# Patient Record
Sex: FEMALE | Race: BLACK OR AFRICAN AMERICAN | Employment: FULL TIME | URBAN - METROPOLITAN AREA
[De-identification: names, ages, dates, MRNs, and addresses within clinical notes are randomized per-mention and may not be internally consistent; named-entity substitution may affect disease eponyms.]

---

## 2021-03-19 LAB
ANTIBODY: NORMAL
HIV AG/AB: NORMAL

## 2022-01-20 LAB — PAP SMEAR: NORMAL

## 2023-01-18 ENCOUNTER — HOSPITAL ENCOUNTER (EMERGENCY)
Facility: CLINIC | Age: 43
Discharge: HOME | End: 2023-01-18
Attending: SPECIALIST
Payer: COMMERCIAL

## 2023-01-18 VITALS
RESPIRATION RATE: 16 BRPM | TEMPERATURE: 99.4 F | SYSTOLIC BLOOD PRESSURE: 142 MMHG | BODY MASS INDEX: 28.63 KG/M2 | WEIGHT: 200 LBS | HEIGHT: 70 IN | HEART RATE: 91 BPM | DIASTOLIC BLOOD PRESSURE: 90 MMHG | OXYGEN SATURATION: 97 %

## 2023-01-18 DIAGNOSIS — U07.1 POSITIVE SELF-ADMINISTERED ANTIGEN TEST FOR COVID-19: Primary | ICD-10-CM

## 2023-01-18 PROCEDURE — 99282 EMERGENCY DEPT VISIT SF MDM: CPT

## 2023-01-18 ASSESSMENT — ENCOUNTER SYMPTOMS
SORE THROAT: 1
BACK PAIN: 0
COUGH: 1
NAUSEA: 0
DIARRHEA: 0
ABDOMINAL PAIN: 0
VOMITING: 0
TROUBLE SWALLOWING: 0
SHORTNESS OF BREATH: 0

## 2023-01-18 ASSESSMENT — PAIN - FUNCTIONAL ASSESSMENT: PAIN_FUNCTIONAL_ASSESSMENT: NONE - DENIES PAIN

## 2023-01-18 NOTE — Clinical Note
Pansy Shoulder was seen and treated in our emergency department on 1/18/2023. She may return to work on 01/20/2023. Patient tested positive for COVID-19, she may return to work on 1/20/2023 if no fever for 24 hours. If you have any questions or concerns, please don't hesitate to call.       Nayan Meza, CHANG - CNP

## 2023-01-19 NOTE — DISCHARGE INSTRUCTIONS
PLEASE RETURN TO THE EMERGENCY DEPARTMENT IMMEDIATELY if your symptoms worsen in anyway. You should immediately return to the ER for symptoms such as new or worsening pain, difficulty breathing or swallowing, a change in your voice, a feeling of passing out, light headed, dizziness, chest pain, headache, neck pain, rash, abdominal pain or vomiting. Your current symptoms may not appear severe to yourself, however you are highly contagious and should self quarantine for at least 10 days from the onset of symptoms or 72 hours after resolution of your fever without any antipyretic medications (whichever is shorter). Isolation strategies should be discussed and reinforced to protect your friends and families. Take your medication as indicated and prescribed. If you are given an antibiotic then, make sure you get the prescription filled and take the antibiotics until finished. Please understand that at this time there is no evidence for a more serious underlying process, but that early in the process of an illness or injury, an emergency department workup can be falsely reassuring. You should contact your family doctor within the next 48 hours for a follow up appointment. Alissa Vázquez!!!    From [facility] Emergency Department ([facility]) and [address] [place] [address] Emergency Services    On behalf of the Emergency Department staff at [facility]), I would like to thank you for giving us the opportunity to address your health care needs and concerns. We hope that during your visit, our service was delivered in a professional and caring manner. Please keep [facility] Emergency Department ([facility]) in mind as we walk with you down the path to your own personal wellness. Please expect an automated text message or email from us so we can ask a few questions about your health and progress. Based on your answers, a clinician may call you back to offer help and instructions.     Please understand that early in the process of an illness or injury, an emergency department workup can be falsely reassuring. If you notice any worsening, changing or persistent symptoms please call your family doctor or return to the ER immediately. Tell us how we did during your visit at [email or web address]   and let us know about your experience

## 2023-01-19 NOTE — ED NOTES
Patient to ED via self to room 9  Here for complaint of being positive for covid-19  Patient states she did an at home test today which came back positive  Patient has fatigue, body aches, and cold-like symptoms  Denies CP, SOB, or N/V    Vitals obtained and call light provided  Patient resting comfortably on stretcher in no apparent distress  Respirations even and non-labored  Provider at bedside for evaluation     Bj Gamble RN  01/18/23 2030

## 2023-01-19 NOTE — ED PROVIDER NOTES
[facility]  [address] [facility]  Phone: [phone]        Pt Name: Shasha Case  MRN: [id number]  [place] 1980  Date of evaluation: 1/18/23    CHIEFCOMPLAINT       Chief Complaint   Patient presents with    Positive For Covid-19       HISTORY OF PRESENT ILLNESS (Location/Symptom, Timing/Onset, Context/Setting, Quality, Duration, Modifying Factors, Severity)      Shasha Case is a 40 y.o. female with no pertinent PMH who presents to the ED via private auto with positive COVID test at home. Patient states she has sore throat, ear pain, runny nose, body aches, fevers and chills. She states that her symptoms started 5 days ago with over the last 2 days she has lost her sense of smell and taste. She denies any chest pain, shortness of breath, difficulty breathing, abdominal pain. She states that she had COVID and flu vaccinated. She states that multiple family numbers are sick as well. On arrival patient is resting on the cot comfortably with even unlabored breaths and is nontoxic-appearing with no acute distress noted. PAST MEDICAL / SURGICAL / SOCIAL / FAMILY HISTORY     PMH:  has a past medical history of Ganglion cyst and Scarring, keloid. Surgical History:  has a past surgical history that includes cyst removal (Right); Lithotripsy; cyst removal (Bilateral); [place] tooth extraction; and cyst removal (Right, 8/4/15). Social History:  reports that she has never smoked. She has never used smokeless tobacco. She reports that she does not drink alcohol and does not use drugs. Family History: She indicated that her mother is alive. She indicated that her father is alive. She indicated that her sister is alive. family history includes Diabetes in her mother; High Blood Pressure in her mother; Mental Illness in her sister. Psychiatric History: None    Allergies: Patient has no known allergies.     Home Medications:   Prior to Admission medications    Medication Sig Start Date End Date Taking? Authorizing Provider   oxyCODONE-acetaminophen (PERCOCET) 5-325 MG per tablet Take 1 tablet by mouth every 6 hours as needed for Pain 8/4/15   Jone Zendejas MD   acetaminophen-codeine (TYLENOL #3) 300-30 MG per tablet Take 1-2 tablets by mouth every 6 hours as needed for Pain 8/4/15   Jone Zendejas MD   NONFORMULARY BCP    Historical Provider, MD       REVIEW OF SYSTEMS  (2-9 systems for level 4, 10 ormore for level 5)      Review of Systems   Constitutional:  Positive for chills and fever. HENT:  Positive for congestion, ear pain and sore throat. Negative for trouble swallowing. Respiratory:  Positive for cough. Negative for shortness of breath. Cardiovascular:  Negative for chest pain and palpitations. Gastrointestinal:  Negative for abdominal pain, diarrhea, nausea and vomiting. Genitourinary:  Negative for dysuria and hematuria. Musculoskeletal:  Positive for myalgias. Negative for back pain, neck pain and neck stiffness. Neurological:  Positive for headaches. Negative for dizziness, weakness and numbness. All other systems negative except as marked. PHYSICAL EXAM  (up to 7 for level 4, 8 or more for level 5)      INITIAL VITALS:  height is 5' 10\" (1.778 m) and weight is 90.7 kg (200 lb). Her oral temperature is 99.4 °F (37.4 °C). Her blood pressure is 142/90 (abnormal) and her pulse is 91. Her respiration is 16 and oxygen saturation is 97%. Vital signs reviewed. Physical Exam  Constitutional:       General: She is not in acute distress. Appearance: Normal appearance. She is not ill-appearing or toxic-appearing. HENT:      Head: Normocephalic and atraumatic. Right Ear: Tympanic membrane, ear canal and external ear normal.      Left Ear: Tympanic membrane, ear canal and external ear normal.      Nose: Congestion and rhinorrhea present. Mouth/Throat:      Mouth: Mucous membranes are moist.      Pharynx: Oropharynx is clear. Cardiovascular:      Rate and Rhythm: Normal rate and regular rhythm. Pulses: Normal pulses. Heart sounds: Normal heart sounds. Pulmonary:      Effort: Pulmonary effort is normal.      Breath sounds: Normal breath sounds. Abdominal:      General: There is no distension. Palpations: Abdomen is soft. There is no mass. Tenderness: There is no abdominal tenderness. There is no guarding or rebound. Hernia: No hernia is present. Musculoskeletal:      Cervical back: Neck supple. No rigidity. Right lower leg: No edema. Left lower leg: No edema. Skin:     General: Skin is warm and dry. Capillary Refill: Capillary refill takes less than 2 seconds. Neurological:      General: No focal deficit present. Mental Status: She is alert. Psychiatric:         Mood and Affect: Mood normal.         Behavior: Behavior normal.         DIFFERENTIAL DIAGNOSIS / MDM     After my physical exam, the patient does have symptoms that  With COVID infection. She is resting on the cot comfortably with even unlabored breaths is nontoxic-appearing with no acute distress noted. Patient is requesting a COVID test for work, I did discuss with her that COVID tests are usually used for patients being admitted. I did offer a work note for the patient to return to work and with her permission I did put on the note that she was positive for COVID. Instructed patient to follow-up with her PCP within 1 day. Urged patient to treat symptomatically at home with over-the-counter cough and cold medications. Directed return with any chest pain, shortness of breath, difficulty breathing, abdominal pain, nausea vomiting diarrhea. Patient was agreement to this plan this time. All question concerns were answered at this time. At this time the patient is without objective evidence of an acute process requiring hospitalization or inpatient management.  They have remained hemodynamically stable throughout their entire ED visit and are stable for discharge with outpatient follow-up. The patient understands that at this time there is no evidence for a more malignant underlying process, but the patient also understands that early in the process of an illness or injury, an emergency department workup can be falsely reassuring. Routine discharge counseling was given, and the patient understands that worsening, changing or persistent symptoms should prompt an immediate call or follow up with their primary physician or return to the emergency department. The importance of appropriate follow up was also discussed. I have reviewed the disposition diagnosis with the patient and or their family/guardian. I have answered their questions and given discharge instructions. They voiced understanding of these instructions and did not have any further questions or complaints. PLAN (LABS / IMAGING / EKG):  No orders of the defined types were placed in this encounter. MEDICATIONS ORDERED:  No orders of the defined types were placed in this encounter. Controlled Substances Monitoring:     DIAGNOSTIC RESULTS     EKG: All EKG's are interpreted by the Emergency Department Physician who either signs or Co-signs this chart in the absenceof a cardiologist.        RADIOLOGY: All images are read by the radiologist and their interpretations are reviewed. No orders to display       No results found. LABS:  No results found for this visit on 01/18/23. EMERGENCY DEPARTMENT COURSE           Vitals:    Vitals:    01/18/23 2026   BP: (!) 142/90   Pulse: 91   Resp: 16   Temp: 99.4 °F (37.4 °C)   TempSrc: Oral   SpO2: 97%   Weight: 90.7 kg (200 lb)   Height: 5' 10\" (1.778 m)     -------------------------  BP: (!) 142/90, Temp: 99.4 °F (37.4 °C), Heart Rate: 91, Resp: 16      RE-EVALUATION:  See ED Course notes above. CONSULTS:  None    PROCEDURES:  None    FINAL IMPRESSION      1.  Positive self-administered antigen test for COVID-19          DISPOSITION / PLAN     CONDITION ON DISPOSITION:   Stable for discharge.      PATIENT REFERRED TO:  Flor Cantor MD  [address] [facility]  [phone]    Call in 1 day      [facility]  [address] [place] [address]  [phone]    If symptoms worsen      DISCHARGE MEDICATIONS:  New Prescriptions    No medications on file       CHANG Lai - [facility]   Emergency Medicine Nurse Practitioner    (Please note that portions of this note were completed with a voice recognition program.  Efforts were made to edit the dictations but occasionally words aremis-transcribed.)       CHANG Lai - CNP  01/18/23 2041

## 2023-01-19 NOTE — ED PROVIDER NOTES
[address] [facility] ED  Emergency Department  Faculty Attestation     Pt Name: Dilip Mclean  MRN: [id number]  [place] 1980  Date of evaluation: 1/18/23    I was personally available for consultation in the Emergency Department. Have reviewed everything on the chart that is available and agree with the documentation provided by the [facility] AND [facility], including discussion about the assessment, treatment plan and disposition. Dilip Mclean is a 40 y.o. female who presents with Positive For Covid-19      Vitals:   Vitals:    01/18/23 2026   BP: (!) 142/90   Pulse: 91   Resp: 16   Temp: 99.4 °F (37.4 °C)   TempSrc: Oral   SpO2: 97%   Weight: 90.7 kg (200 lb)   Height: 5' 10\" (1.778 m)       Impression:   1.  Positive self-administered antigen test for COVID-19          Agnieszka Gupta MD  01/18/23 2049

## 2023-06-15 ENCOUNTER — TELEPHONE (OUTPATIENT)
Age: 43
End: 2023-06-15

## 2023-06-28 RX ORDER — ESCITALOPRAM OXALATE 10 MG/1
10 TABLET ORAL DAILY
Qty: 90 TABLET | Refills: 3 | Status: SHIPPED | OUTPATIENT
Start: 2023-06-28

## 2023-07-03 ENCOUNTER — TELEPHONE (OUTPATIENT)
Age: 43
End: 2023-07-03

## 2023-07-03 NOTE — TELEPHONE ENCOUNTER
Patient seen at [facility] urgent care today. Please see encounter tab for note.

## 2023-07-03 NOTE — TELEPHONE ENCOUNTER
Looks like she was seen for potential UTI. I am unable to see record, but I can see U/A results and it looked suspicious for UTI.

## 2023-07-05 NOTE — TELEPHONE ENCOUNTER
Patient called and stated she was given bactrim at the urgent care for her UTI symptoms and tonight will be her last dosage that she has to take and she stated that the symptoms has gotten a little better but she is still having some urgency and spasms. So she wanted to know if she can get another round of antibiotics. Patient stated that you can leave a message on her voicemail.

## 2023-07-06 NOTE — TELEPHONE ENCOUNTER
Urine culture resulted and shows no UTI. The urgency feeling and spasms are not related to UTI. If she is still having these symptoms, she needs to be evaluated to see what is actually the cause so we can make it better. Thank you!

## 2023-07-17 DIAGNOSIS — Z11.4 ENCOUNTER FOR SCREENING FOR HIV: Primary | ICD-10-CM

## 2023-07-17 DIAGNOSIS — Z12.4 CERVICAL CANCER SCREENING: ICD-10-CM

## 2023-07-17 DIAGNOSIS — Z11.59 ENCOUNTER FOR HEPATITIS C SCREENING TEST FOR LOW RISK PATIENT: ICD-10-CM

## 2023-07-17 PROBLEM — F33.1 MODERATE EPISODE OF RECURRENT MAJOR DEPRESSIVE DISORDER (HCC): Status: ACTIVE | Noted: 2018-05-03

## 2023-07-17 PROBLEM — F41.1 GENERALIZED ANXIETY DISORDER: Status: ACTIVE | Noted: 2023-03-07

## 2023-07-17 PROBLEM — J30.89 NON-SEASONAL ALLERGIC RHINITIS: Status: ACTIVE | Noted: 2023-07-17

## 2023-07-17 PROBLEM — D21.9 FIBROIDS: Status: ACTIVE | Noted: 2023-07-17

## 2023-07-17 PROBLEM — K76.89 BENIGN LIVER CYST: Status: ACTIVE | Noted: 2023-07-17

## 2023-07-17 PROBLEM — D18.03 HEPATIC HEMANGIOMA: Status: ACTIVE | Noted: 2023-07-17

## 2023-07-17 PROBLEM — N28.9 RENAL LESION: Status: ACTIVE | Noted: 2023-07-17

## 2023-07-17 PROBLEM — Z78.9 NON-SMOKER: Status: ACTIVE | Noted: 2023-07-17

## 2023-07-17 NOTE — PROGRESS NOTES
Mapping quality metric labs/imagining done outside [facility].

## 2023-07-17 NOTE — PROGRESS NOTES
o  [address] Family Medicine Residency  [address] [facility], [facility] [facility]  Phone: [phone]  Fax: [phone]     Manuela Gregg is a 40 y.o. female for an annual wellness exam.    HPI     Subjective:  She has been feeling fine and has had no new problems since last office visit. REVIEWED INFORMATION    I have personally reviewed the medications, PMH, PSH, FH, allergies and social history. Past Medical History:   Diagnosis Date    Allergies     Cervical dysplasia     Ganglion cyst     Hemorrhoids     Scarring, keloid      Past Surgical History:   Procedure Laterality Date    DILATION AND CURETTAGE OF UTERUS  11/03/2020    hysteroscopy, D&C, novasure endometrial ablation, BTL (Dr Brandon Javier @ [facility])    ENDOMETRIAL ABLATION  11/03/2020    hysteroscopy, D&C, novasure endometrial ablation, BTL (Dr Brandon Javier @ [facility])    GANGLION CYST EXCISION Right 08/04/2015    HYSTEROSCOPY  11/03/2020    hysteroscopy, D&C, novasure endometrial ablation, BTL (Dr Brandon Javier @ [facility])    KELOID EXCISION Bilateral     keloids removed from bilateral ears    KNEE ARTHROSCOPY W/ PLICA EXCISION Left 69/04/9038    left knee arthroscopy with fat pad debridement and plica excision (Dr Mikel Valadez @ [facility])    KNEE ARTHROSCOPY W/ PLICA EXCISION Right 27/32/2980    right knee arthroscopy with fat pad debridement and plica excision (Dr Mikel Valadez @ [facility])    LEEP  04/18/2017    Dr Brandon Javier @ [facility]    LITHOTRIPSY Bilateral 10/28/2022    Lithotripsy for bilateral kidney stones (Dr Yonathan Mckinnon @ [facility])    LITHOTRIPSY  2010    MYOMECTOMY  2017    Dr Lai Manual  11/03/2020    hysteroscopy, D&C, novasure endometrial ablation, BTL (Dr Brandon Javier @ [facility])    WISDOM TOOTH EXTRACTION       Patient has no known allergies.   Social History     Tobacco Use    Smoking status: Never    Smokeless tobacco: Never   Substance Use Topics    Alcohol use: No     Family

## 2023-07-18 ENCOUNTER — OFFICE VISIT (OUTPATIENT)
Age: 43
End: 2023-07-18
Payer: COMMERCIAL

## 2023-07-18 VITALS
TEMPERATURE: 98.7 F | SYSTOLIC BLOOD PRESSURE: 112 MMHG | BODY MASS INDEX: 30.09 KG/M2 | WEIGHT: 210.2 LBS | HEIGHT: 70 IN | HEART RATE: 80 BPM | DIASTOLIC BLOOD PRESSURE: 74 MMHG | RESPIRATION RATE: 18 BRPM

## 2023-07-18 DIAGNOSIS — R73.9 ELEVATED BLOOD SUGAR: ICD-10-CM

## 2023-07-18 DIAGNOSIS — F33.1 MODERATE EPISODE OF RECURRENT MAJOR DEPRESSIVE DISORDER (HCC): ICD-10-CM

## 2023-07-18 DIAGNOSIS — Z78.9 NON-SMOKER: ICD-10-CM

## 2023-07-18 DIAGNOSIS — Z12.83 SKIN CANCER SCREENING: ICD-10-CM

## 2023-07-18 DIAGNOSIS — Z00.00 HEALTH MAINTENANCE EXAMINATION: Primary | ICD-10-CM

## 2023-07-18 DIAGNOSIS — F41.1 GENERALIZED ANXIETY DISORDER: ICD-10-CM

## 2023-07-18 PROCEDURE — 99396 PREV VISIT EST AGE 40-64: CPT | Performed by: FAMILY MEDICINE

## 2023-07-18 SDOH — ECONOMIC STABILITY: FOOD INSECURITY: WITHIN THE PAST 12 MONTHS, YOU WORRIED THAT YOUR FOOD WOULD RUN OUT BEFORE YOU GOT MONEY TO BUY MORE.: NEVER TRUE

## 2023-07-18 SDOH — ECONOMIC STABILITY: INCOME INSECURITY: HOW HARD IS IT FOR YOU TO PAY FOR THE VERY BASICS LIKE FOOD, HOUSING, MEDICAL CARE, AND HEATING?: NOT HARD AT ALL

## 2023-07-18 SDOH — ECONOMIC STABILITY: FOOD INSECURITY: WITHIN THE PAST 12 MONTHS, THE FOOD YOU BOUGHT JUST DIDN'T LAST AND YOU DIDN'T HAVE MONEY TO GET MORE.: NEVER TRUE

## 2023-07-18 SDOH — ECONOMIC STABILITY: HOUSING INSECURITY
IN THE LAST 12 MONTHS, WAS THERE A TIME WHEN YOU DID NOT HAVE A STEADY PLACE TO SLEEP OR SLEPT IN A SHELTER (INCLUDING NOW)?: NO

## 2023-07-18 ASSESSMENT — PATIENT HEALTH QUESTIONNAIRE - PHQ9
6. FEELING BAD ABOUT YOURSELF - OR THAT YOU ARE A FAILURE OR HAVE LET YOURSELF OR YOUR FAMILY DOWN: 0
4. FEELING TIRED OR HAVING LITTLE ENERGY: 0
SUM OF ALL RESPONSES TO PHQ QUESTIONS 1-9: 1
5. POOR APPETITE OR OVEREATING: 0
3. TROUBLE FALLING OR STAYING ASLEEP: 0
9. THOUGHTS THAT YOU WOULD BE BETTER OFF DEAD, OR OF HURTING YOURSELF: 0
SUM OF ALL RESPONSES TO PHQ QUESTIONS 1-9: 1
10. IF YOU CHECKED OFF ANY PROBLEMS, HOW DIFFICULT HAVE THESE PROBLEMS MADE IT FOR YOU TO DO YOUR WORK, TAKE CARE OF THINGS AT HOME, OR GET ALONG WITH OTHER PEOPLE: 1
7. TROUBLE CONCENTRATING ON THINGS, SUCH AS READING THE NEWSPAPER OR WATCHING TELEVISION: 1
8. MOVING OR SPEAKING SO SLOWLY THAT OTHER PEOPLE COULD HAVE NOTICED. OR THE OPPOSITE, BEING SO FIGETY OR RESTLESS THAT YOU HAVE BEEN MOVING AROUND A LOT MORE THAN USUAL: 0
SUM OF ALL RESPONSES TO PHQ QUESTIONS 1-9: 1
2. FEELING DOWN, DEPRESSED OR HOPELESS: 0
SUM OF ALL RESPONSES TO PHQ9 QUESTIONS 1 & 2: 0
SUM OF ALL RESPONSES TO PHQ QUESTIONS 1-9: 1
1. LITTLE INTEREST OR PLEASURE IN DOING THINGS: 0

## 2023-07-18 NOTE — PATIENT INSTRUCTIONS
You are up to date on all of your health screenings other than the diabetes and cholesterol screening (via bloodwork). You can get these done at the nearest [facility] lab that is most convenient for you. If the referral for your dermatologist doesn't work (due to insurance), please let me know. You have a generic referral for a new counselor. I suggest seeing if Lolita Escalona (from [facility] in [place]) is taking new patients - she is completely virtual for her appointments. If not, if you would like another suggestion, please let me know.

## 2023-07-18 NOTE — PROGRESS NOTES
[address] [facility]  [address] [facility], [address]  Phone: [phone]  Fax: [phone]     Juana Tatum is a 40 y.o. female for an annual wellness exam.    HPI     Subjective:  She has been feeling fine and has had no new problems since last office visit. REVIEWED INFORMATION    I have personally reviewed the medications, PMH, PSH, FH, allergies and social history. Past Medical History:   Diagnosis Date    Allergies     Cervical dysplasia     Ganglion cyst     Hemorrhoids     Scarring, keloid      Past Surgical History:   Procedure Laterality Date    DILATION AND CURETTAGE OF UTERUS  11/03/2020    hysteroscopy, D&C, novasure endometrial ablation, BTL (Dr Oscar Ruffin @ [facility])    ENDOMETRIAL ABLATION  11/03/2020    hysteroscopy, D&C, novasure endometrial ablation, BTL (Dr Oscar Ruffin @ [facility])    GANGLION CYST EXCISION Right 08/04/2015    HYSTEROSCOPY  11/03/2020    hysteroscopy, D&C, novasure endometrial ablation, BTL (Dr Oscar Ruffin @ [facility])    KELOID EXCISION Bilateral     keloids removed from bilateral ears    KNEE ARTHROSCOPY W/ PLICA EXCISION Left 77/77/9526    left knee arthroscopy with fat pad debridement and plica excision (Dr Iris Carver @ [facility])    KNEE ARTHROSCOPY W/ PLICA EXCISION Right 76/22/6464    right knee arthroscopy with fat pad debridement and plica excision (Dr Iris Carver @ [facility])    LEEP  04/18/2017    Dr Oscar Ruffin @ [facility]    LITHOTRIPSY Bilateral 10/28/2022    Lithotripsy for bilateral kidney stones (Dr Janine Mohr @ [facility])    LITHOTRIPSY  2010    MYOMECTOMY  2017    Dr Petar Altamirano  11/03/2020    hysteroscopy, D&C, novasure endometrial ablation, BTL (Dr Oscar Ruffin @ [facility])    WISDOM TOOTH EXTRACTION       Patient has no known allergies.   Social History     Tobacco Use    Smoking status: Never    Smokeless tobacco: Never   Substance Use Topics    Alcohol use: No     Family History

## 2023-12-04 ENCOUNTER — TELEPHONE (OUTPATIENT)
Age: 43
End: 2023-12-04

## 2023-12-04 DIAGNOSIS — G47.9 SLEEP DISTURBANCE: Primary | ICD-10-CM

## 2023-12-04 RX ORDER — HYDROXYZINE HYDROCHLORIDE 25 MG/1
25 TABLET, FILM COATED ORAL NIGHTLY
Qty: 7 TABLET | Refills: 0 | Status: SHIPPED | OUTPATIENT
Start: 2023-12-04 | End: 2023-12-11

## 2023-12-04 NOTE — TELEPHONE ENCOUNTER
Spoke with patient, no suicidal ideations, father was in hospital . Informed of RX to pharm will keep appt tomorrow.

## 2023-12-04 NOTE — TELEPHONE ENCOUNTER
Patient's father passed away. Asking for medication to help her sleep. Has an appointment tomorrow with Dr Hari Jeter. She just wanted to make Dr Ainsley Macias aware.

## 2023-12-04 NOTE — TELEPHONE ENCOUNTER
Please express our condolences. I attempted to call her and went to voicemail, I did send in some hydroxyzine she can take tonight to help her sleep. Please make sure no suicidal ideations, worsening depression, red flag sx. Dr Titi Griffith is not in office but will leave this in her box.  Please keep appointment with Dr. Semaj Pérez tomorrow

## 2023-12-12 ENCOUNTER — TELEMEDICINE (OUTPATIENT)
Age: 43
End: 2023-12-12
Payer: COMMERCIAL

## 2023-12-12 DIAGNOSIS — F41.1 GENERALIZED ANXIETY DISORDER: ICD-10-CM

## 2023-12-12 DIAGNOSIS — F51.04 PSYCHOPHYSIOLOGICAL INSOMNIA: ICD-10-CM

## 2023-12-12 DIAGNOSIS — F33.1 MODERATE EPISODE OF RECURRENT MAJOR DEPRESSIVE DISORDER (HCC): Primary | ICD-10-CM

## 2023-12-12 PROCEDURE — 99214 OFFICE O/P EST MOD 30 MIN: CPT | Performed by: FAMILY MEDICINE

## 2023-12-12 RX ORDER — TRAZODONE HYDROCHLORIDE 50 MG/1
50 TABLET ORAL NIGHTLY PRN
COMMUNITY
End: 2023-12-12

## 2023-12-12 RX ORDER — TRAZODONE HYDROCHLORIDE 50 MG/1
50 TABLET ORAL NIGHTLY PRN
Qty: 30 TABLET | Refills: 2 | Status: SHIPPED | OUTPATIENT
Start: 2023-12-12

## 2023-12-12 RX ORDER — ESCITALOPRAM OXALATE 20 MG/1
20 TABLET ORAL DAILY
Qty: 90 TABLET | Refills: 3 | Status: SHIPPED | OUTPATIENT
Start: 2023-12-12

## 2023-12-12 NOTE — PROGRESS NOTES
[address] [facility] Family Medicine Residency  [address] [facility], [address]  Phone: [phone]  Fax: [phone]      Date of Visit:  12/15/2023  Patient Name: Irvin Pineda   Patient :  1980       ASSESSMENT/PLAN   1. Moderate episode of recurrent major depressive disorder (HCC)  -     escitalopram (LEXAPRO) 20 MG tablet; Take 1 tablet by mouth daily, Disp-90 tablet, R-3Normal  2. Generalized anxiety disorder  -     escitalopram (LEXAPRO) 20 MG tablet; Take 1 tablet by mouth daily, Disp-90 tablet, R-3Normal  3. Psychophysiological insomnia  -     traZODone (DESYREL) 50 MG tablet; Take 1 tablet by mouth nightly as needed for Sleep, Disp-30 tablet, R-2Normal       Patient Instructions   Increase lexapro to 20mg. Start trazodone 50mg prn for sleep. We reviewed side effects of medications and appropriate use. Return in about 6 weeks (around 2024) for aniexty/depression/sleep. HPI   Irvin Pineda is a 44 y.o. female who presents today to discuss   Chief Complaint   Patient presents with    Anxiety    Depression    Sleep Problem     Was sent atarax. Hasn't started. Interested in using trazodone for sleep prn. Thinking a lot at night when trying to go to sleep about how her dad looked and how he felt. Was napping during day, but now back at work and thinks cannot nap. Feeling okay in general related to her dad's death. Has been able to shower, take care of herself, and has gone to do things with her mom. Does have increased stressors with her daughter - worried about her mental health. Would like to increase lexapro from 10 to 20mg. Tolerating 10mg without SE.     REVIEW OF SYSTEM    Denies: self harm thoughts, suicidal ideation  Admits to: sleeping issues, worsening anxiety in general    PHYSICAL EXAM   Unable to obtain vitals with virtual visit.     General: alert, NAD, healthy appearing, normal voice/communication, well

## 2023-12-15 NOTE — PATIENT INSTRUCTIONS
Increase lexapro to 20mg. Start trazodone 50mg prn for sleep. We reviewed side effects of medications and appropriate use.

## 2024-01-04 DIAGNOSIS — F51.04 PSYCHOPHYSIOLOGICAL INSOMNIA: ICD-10-CM

## 2024-01-04 RX ORDER — TRAZODONE HYDROCHLORIDE 50 MG/1
50 TABLET ORAL NIGHTLY PRN
Qty: 30 TABLET | Refills: 2 | Status: SHIPPED | OUTPATIENT
Start: 2024-01-04

## 2024-01-31 ENCOUNTER — TELEPHONE (OUTPATIENT)
Age: 44
End: 2024-01-31

## 2024-01-31 NOTE — TELEPHONE ENCOUNTER
Patient called and stated that she currently doesn't have any health insurance so she has not been able to go to the doctor or the ER but she stated that she has been off work for 2 days because she has passed a kidney stone and wanted to know if you can write her a return to work note.

## 2024-01-31 NOTE — TELEPHONE ENCOUNTER
Please write a return to work note for her. Please also see how she is feeling and if she passed the stone or has any ongoing needs related to a stone. Thank you!

## 2024-02-01 NOTE — TELEPHONE ENCOUNTER
Reviewed with patient in detail. State 's new insurance will be effective 3/1/2024.  She no longer have Medicaid due to household income change.      State she has not passed the \"stone\" and pain comes in waves, doing \"ok\" right now.  Plans on going back to work tomorrow.  Reviewed emergent symptoms with patient on when to seek medical care, patient verbalized understanding.      Work note will be generated and faxed (as requested) to Attn: Gavin Issa.  [phone].  State date (off work) 1/30/2024 - 2/1/2024; return to work 2/2/2024.      Advised patient to check with HR after 1 pm today to make sure they received the letter and to call our office if she needs anything further.

## 2024-07-23 ENCOUNTER — HOSPITAL ENCOUNTER (OUTPATIENT)
Age: 44
Setting detail: SPECIMEN
Discharge: HOME | End: 2024-07-23

## 2024-07-23 ENCOUNTER — OFFICE VISIT (OUTPATIENT)
Age: 44
End: 2024-07-23
Payer: COMMERCIAL

## 2024-07-23 VITALS
RESPIRATION RATE: 18 BRPM | HEART RATE: 75 BPM | HEIGHT: 70 IN | DIASTOLIC BLOOD PRESSURE: 78 MMHG | BODY MASS INDEX: 34.22 KG/M2 | WEIGHT: 239 LBS | TEMPERATURE: 98.1 F | SYSTOLIC BLOOD PRESSURE: 132 MMHG

## 2024-07-23 DIAGNOSIS — R73.9 ELEVATED BLOOD SUGAR: ICD-10-CM

## 2024-07-23 DIAGNOSIS — H91.8X2 OTHER HEARING LOSS OF LEFT EAR WITH UNRESTRICTED HEARING OF RIGHT EAR: ICD-10-CM

## 2024-07-23 DIAGNOSIS — Z00.01 ENCOUNTER FOR GENERAL ADULT MEDICAL EXAMINATION WITH ABNORMAL FINDINGS: Primary | ICD-10-CM

## 2024-07-23 DIAGNOSIS — E66.09 CLASS 1 OBESITY DUE TO EXCESS CALORIES WITHOUT SERIOUS COMORBIDITY WITH BODY MASS INDEX (BMI) OF 34.0 TO 34.9 IN ADULT: ICD-10-CM

## 2024-07-23 DIAGNOSIS — Z12.83 SKIN CANCER SCREENING: ICD-10-CM

## 2024-07-23 DIAGNOSIS — Z12.31 BREAST CANCER SCREENING BY MAMMOGRAM: ICD-10-CM

## 2024-07-23 DIAGNOSIS — N62 MACROMASTIA: ICD-10-CM

## 2024-07-23 LAB
ALBUMIN SERPL-MCNC: 4.3 G/DL (ref 3.5–5.2)
ALBUMIN/GLOB SERPL: 1 {RATIO} (ref 1–2.5)
ALP SERPL-CCNC: 64 U/L (ref 35–104)
ALT SERPL-CCNC: 15 U/L (ref 10–35)
ANION GAP SERPL CALCULATED.3IONS-SCNC: 9 MMOL/L (ref 9–16)
AST SERPL-CCNC: 17 U/L (ref 10–35)
BILIRUB SERPL-MCNC: 0.2 MG/DL (ref 0–1.2)
BUN SERPL-MCNC: 9 MG/DL (ref 6–20)
CALCIUM SERPL-MCNC: 8.8 MG/DL (ref 8.6–10.4)
CHLORIDE SERPL-SCNC: 104 MMOL/L (ref 98–107)
CHOLEST SERPL-MCNC: 145 MG/DL (ref 0–199)
CHOLESTEROL/HDL RATIO: 3
CO2 SERPL-SCNC: 25 MMOL/L (ref 20–31)
CREAT SERPL-MCNC: 1 MG/DL (ref 0.5–0.9)
EST. AVERAGE GLUCOSE BLD GHB EST-MCNC: 126 MG/DL
GFR, ESTIMATED: 70 ML/MIN/1.73M2
GLUCOSE SERPL-MCNC: 100 MG/DL (ref 74–99)
HBA1C MFR BLD: 6 % (ref 4–6)
HDLC SERPL-MCNC: 56 MG/DL
LDLC SERPL CALC-MCNC: 64 MG/DL (ref 0–100)
POTASSIUM SERPL-SCNC: 4.1 MMOL/L (ref 3.7–5.3)
PROT SERPL-MCNC: 7.2 G/DL (ref 6.6–8.7)
SODIUM SERPL-SCNC: 138 MMOL/L (ref 136–145)
TRIGL SERPL-MCNC: 125 MG/DL
VLDLC SERPL CALC-MCNC: 25 MG/DL

## 2024-07-23 PROCEDURE — 99212 OFFICE O/P EST SF 10 MIN: CPT | Performed by: FAMILY MEDICINE

## 2024-07-23 PROCEDURE — 99396 PREV VISIT EST AGE 40-64: CPT | Performed by: FAMILY MEDICINE

## 2024-07-23 NOTE — PROGRESS NOTES
testing, imaging, consultation, and follow up as documented in this encounter.   - Return in about 6 months (around 1/23/2025) for mood follow up.    Please be aware portions of this note were completed using voice recognition software and unforeseen errors may have occurred    Electronically signed by Tiffany Dewey MD

## 2024-07-23 NOTE — PATIENT INSTRUCTIONS
Semaglutide (aka Wegovy) was sent to the pharmacy. If this is not covered, we can do compounded Wegovy from [facility] (max out of pocket cost would be just under $300). Let me know if we need to go the compounded route.    You have the referral for breast reduction, having a formal hearing test, and seeing the dermatologist.     Have your labs done at your convenience - you don't have to fast for these.    You are up to date on your preventive health after having the labs done and your mammogram completed.     Below is some guidance on how to make your diet healthier and still get the protein you need so you don't lose skeletal muscle (important for bone support so you don't get osteoporosis as you age as well as it sets your metabolism rate).                        Resources:   https://www.precisionnutrition.com/what-should-i-eat-infographic  https://www.precisionGramovox.com/calorie-control-guide-infographic

## 2024-07-24 ENCOUNTER — TELEPHONE (OUTPATIENT)
Age: 44
End: 2024-07-24

## 2024-07-24 DIAGNOSIS — E66.09 CLASS 1 OBESITY DUE TO EXCESS CALORIES WITHOUT SERIOUS COMORBIDITY WITH BODY MASS INDEX (BMI) OF 34.0 TO 34.9 IN ADULT: ICD-10-CM

## 2024-07-24 DIAGNOSIS — R73.03 PREDIABETES: Primary | ICD-10-CM

## 2024-07-24 DIAGNOSIS — R79.89 ELEVATED SERUM CREATININE: Primary | ICD-10-CM

## 2024-07-24 NOTE — TELEPHONE ENCOUNTER
Since the denial, I dx with prediabetes with an A1c of 6%. I sent the script back to the pharmacy with the attached dx of prediabetes. Will they cover this for prediabetes? If not, I'll send in the [employer] form at that time.    Thanks!

## 2024-07-24 NOTE — TELEPHONE ENCOUNTER
Received a response from patient's insurance company that they have denied this patient's Semaglutide medication.  You may read the reason why if you look under patient's referral tab for prior authorization.    Patient would like a script to go to [facility].

## 2024-07-26 NOTE — TELEPHONE ENCOUNTER
[facility] compounds on Thursdays so they wouldn't do it until next week anyway. We can wait for the prior auth at this time. Thanks!

## 2024-07-26 NOTE — TELEPHONE ENCOUNTER
Spoke with patient' pharmacy today and she stated that they are asking for another prior authorization, waiting for them to send it to me. Do you want me to complete it or do you want to just send the script to [facility]?

## 2024-07-29 ENCOUNTER — TELEPHONE (OUTPATIENT)
Age: 44
End: 2024-07-29

## 2024-07-30 NOTE — TELEPHONE ENCOUNTER
Please see if Janine wants to go the [employer] route with a starting dose while we do an appeal. I highly suspect they will deny. If they don't deny, we can then do the next dose (next month) starting with insurance. The other option is to wait a couple weeks to find out if they deny and then go through [employer] if they do.    Thank you!

## 2024-07-30 NOTE — TELEPHONE ENCOUNTER
Dr. Dewey unfortunately we can not do another prior authorization for this prescription because her insurance is considering it to be a duplicate prior authorization, so you will need to write an appeal letter stating why the patient should be on this medication and you can put her new diagnosis on their also. Please make sure this information is on the letter.    Patient name  ID#[id number]  Reference #[id number]  Dr. Tiffany Dewey PCP Authorized Representative    One you complete the letter I will fax it to [phone] along with her last HGA1C results.

## 2024-07-30 NOTE — TELEPHONE ENCOUNTER
Please call Janine with her lab results message that I sent a [employer] message but it was not read. Thank you!        \"Janine,     You cholesterol is great - there is nothing we need to do with this because it looks ideal.     Your kidney function (creatinine) is a little high. This is not normal for you. Be sure you are staying well hydrated. I'd like to recheck this in a couple of weeks to ensure it comes back to normal.     You do have prediabetes with an A1c of 6.0%. This is midrange for prediabetes which is an A1c of 5.7% - 6.4%. If you reach 6.5%, you will have the diagnosis of diabetes which we want to avoid. Hopefully we can get the wegovy covered so that would help the prediabetes as well.     Please let me know you got this message, are able to get the retest for the kidney function in a couple weeks (ordered and can be done at any [facility] lab because the order is in the system), and if you have any questions.     Have a great week,  Dr. Dewey

## 2024-08-27 DIAGNOSIS — R73.03 PREDIABETES: ICD-10-CM

## 2024-08-27 DIAGNOSIS — E66.09 CLASS 1 OBESITY DUE TO EXCESS CALORIES WITHOUT SERIOUS COMORBIDITY WITH BODY MASS INDEX (BMI) OF 34.0 TO 34.9 IN ADULT: ICD-10-CM

## 2024-08-28 ENCOUNTER — TELEPHONE (OUTPATIENT)
Age: 44
End: 2024-08-28

## 2024-08-28 DIAGNOSIS — E66.09 CLASS 1 OBESITY DUE TO EXCESS CALORIES WITHOUT SERIOUS COMORBIDITY WITH BODY MASS INDEX (BMI) OF 34.0 TO 34.9 IN ADULT: ICD-10-CM

## 2024-08-28 DIAGNOSIS — R73.03 PREDIABETES: ICD-10-CM

## 2024-08-29 NOTE — TELEPHONE ENCOUNTER
Patient script was faxed today.  
Please call Janine to get answer from the [employer] message. Thank you!        Janine,     How are you doing with the compounded Wegovy? If you are tolerating it well, I can increase the dose for your next fill.      Please let me know if you want to continue the same dose (haven't acclimated yet) or if you would like to increase.     Have a great week!  -Dr. Dewey     
Please fax form to [facility]'s pharmacy. Thank you!  
Spoke with patient and she stated that she feels fine on the medication and yes she would like a dose increase.  
today

## 2024-09-28 DIAGNOSIS — F33.1 MODERATE EPISODE OF RECURRENT MAJOR DEPRESSIVE DISORDER (HCC): ICD-10-CM

## 2024-09-28 DIAGNOSIS — E66.811 CLASS 1 OBESITY DUE TO EXCESS CALORIES WITHOUT SERIOUS COMORBIDITY WITH BODY MASS INDEX (BMI) OF 34.0 TO 34.9 IN ADULT: ICD-10-CM

## 2024-09-28 DIAGNOSIS — F41.1 GENERALIZED ANXIETY DISORDER: ICD-10-CM

## 2024-09-28 DIAGNOSIS — R73.03 PREDIABETES: ICD-10-CM

## 2024-09-28 DIAGNOSIS — E66.09 CLASS 1 OBESITY DUE TO EXCESS CALORIES WITHOUT SERIOUS COMORBIDITY WITH BODY MASS INDEX (BMI) OF 34.0 TO 34.9 IN ADULT: ICD-10-CM

## 2024-09-30 RX ORDER — ESCITALOPRAM OXALATE 20 MG/1
20 TABLET ORAL DAILY
Qty: 90 TABLET | Refills: 3 | Status: SHIPPED | OUTPATIENT
Start: 2024-09-30

## 2024-09-30 NOTE — TELEPHONE ENCOUNTER
Confirmed with Janine she would like to increase dose and is tolerating well.  Updated dose in chart.   Please fax form.    Thanks!

## 2024-10-07 ENCOUNTER — HOSPITAL ENCOUNTER (OUTPATIENT)
Age: 44
Setting detail: SPECIMEN
Discharge: HOME | End: 2024-10-07

## 2024-10-07 DIAGNOSIS — R79.89 ELEVATED SERUM CREATININE: ICD-10-CM

## 2024-10-08 LAB
ANION GAP SERPL CALCULATED.3IONS-SCNC: 10 MMOL/L (ref 9–16)
BUN SERPL-MCNC: 6 MG/DL (ref 6–20)
CALCIUM SERPL-MCNC: 8.8 MG/DL (ref 8.6–10.4)
CHLORIDE SERPL-SCNC: 104 MMOL/L (ref 98–107)
CO2 SERPL-SCNC: 23 MMOL/L (ref 20–31)
CREAT SERPL-MCNC: 0.9 MG/DL (ref 0.5–0.9)
GFR, ESTIMATED: 88 ML/MIN/1.73M2
GLUCOSE SERPL-MCNC: 64 MG/DL (ref 74–99)
POTASSIUM SERPL-SCNC: 4.1 MMOL/L (ref 3.7–5.3)
SODIUM SERPL-SCNC: 137 MMOL/L (ref 136–145)

## 2024-11-11 DIAGNOSIS — E66.811 CLASS 1 OBESITY DUE TO EXCESS CALORIES WITHOUT SERIOUS COMORBIDITY WITH BODY MASS INDEX (BMI) OF 34.0 TO 34.9 IN ADULT: ICD-10-CM

## 2024-11-11 DIAGNOSIS — E66.09 CLASS 1 OBESITY DUE TO EXCESS CALORIES WITHOUT SERIOUS COMORBIDITY WITH BODY MASS INDEX (BMI) OF 34.0 TO 34.9 IN ADULT: ICD-10-CM

## 2024-11-11 DIAGNOSIS — R73.03 PREDIABETES: ICD-10-CM

## 2024-11-11 NOTE — TELEPHONE ENCOUNTER
Please fax [employer]'s form. I spoke with Janine and we are staying at current dose and not increasing this month.   Thank you!

## 2024-11-20 NOTE — PROGRESS NOTES
Complaint   Patient presents with    Weight Management     Patient tolerating Wegovy.  Wants to get weight down to help take her away from being preDM.     Has been on compounded wegovy for 3 months. Tolerating other than significant heartburn. Started pepcid 20mg - not taking every day. Heartburn is better, but notices with certain things - lemonade and sour candies cause that. This is tolerable. Last fill did not increase the dose due to the heartburn so still on 1.2mg subQ weekly.  Has lost 26 lbs. Has noticed more energy. Feels good. Losing weight and may want to go back down to the lower dose due to feeling full very quickly - doesn't want to not be able to eat. Doesn't eat as much junk food as she used to - can't eat anything sweet (can't even do her [employer]). Doesn't eat a whole back of chips or pretzels like she use to eat. Daughter got her a mini-fridge at work where she keeps fruit and healthier options. Eating protein smoothie for breakfast (frozen strawberries, 4 oz cranberry juice, 1/2 bottle strawberry premier protein drink (so 15g protein) and dinner (but cannot eat a lot).     Ears tickle in the canals - doesn't put anything in them (no Qtips, no eardrops, no ear , etc). No pain, just tickle like she needs to rub them (not quite an itch).     REVIEW OF SYSTEM    As above.    PHYSICAL EXAM   BP (!) 129/91 (Site: Right Upper Arm, Position: Sitting)   Pulse 81   Temp 98.1 °F (36.7 °C) (Oral)   Resp 18   Wt 96.8 kg (213 lb 4.8 oz)   BMI 30.61 kg/m²      Constitutional: Well-appearing, well groomed, no apparent distress.  Ears: bilateral normal ear canals. Minimal wax at proximal canal bilaterally. Dry skin in bilateral ear canal. No lesions or other skin changes.    LABS     Hospital Outpatient Visit on 10/07/2024   Component Date Value Ref Range Status    Sodium 10/07/2024 137  136 - 145 mmol/L Final    Potassium 10/07/2024 4.1  3.7 - 5.3 mmol/L Final    Chloride 10/07/2024 104  98 - 107

## 2024-11-21 ENCOUNTER — OFFICE VISIT (OUTPATIENT)
Age: 44
End: 2024-11-21
Payer: COMMERCIAL

## 2024-11-21 VITALS
DIASTOLIC BLOOD PRESSURE: 91 MMHG | RESPIRATION RATE: 18 BRPM | HEART RATE: 81 BPM | SYSTOLIC BLOOD PRESSURE: 129 MMHG | WEIGHT: 213.3 LBS | TEMPERATURE: 98.1 F | BODY MASS INDEX: 30.61 KG/M2

## 2024-11-21 DIAGNOSIS — E66.811 CLASS 1 OBESITY DUE TO EXCESS CALORIES WITH SERIOUS COMORBIDITY AND BODY MASS INDEX (BMI) OF 30.0 TO 30.9 IN ADULT: Primary | ICD-10-CM

## 2024-11-21 DIAGNOSIS — Z78.9 NON-SMOKER: ICD-10-CM

## 2024-11-21 DIAGNOSIS — R73.03 PREDIABETES: ICD-10-CM

## 2024-11-21 DIAGNOSIS — E66.09 CLASS 1 OBESITY DUE TO EXCESS CALORIES WITH SERIOUS COMORBIDITY AND BODY MASS INDEX (BMI) OF 30.0 TO 30.9 IN ADULT: Primary | ICD-10-CM

## 2024-11-21 PROCEDURE — 99213 OFFICE O/P EST LOW 20 MIN: CPT | Performed by: FAMILY MEDICINE

## 2024-11-21 PROCEDURE — 99212 OFFICE O/P EST SF 10 MIN: CPT | Performed by: FAMILY MEDICINE

## 2024-11-21 NOTE — PATIENT INSTRUCTIONS
Start taking a multivitamin daily while you are not getting as many fruits and veggies right now.    Try to increase your protein intake. We want to make sure you are losing fat weight and not muscle mass - particularly heading into the perimenopause period.     You can try to schedule with Rhonda Mauricio, a sports dietician working through [employer]. This will likely be out of pocket costs (I'm unsure of that cost). She can help you get an idea of how to construct your diet to fuel your body, get enough protein, and ensure you are getting micronutrients as well.     You can get the A1c whenever you want - you don't need to fast for it.

## 2025-04-24 LAB — PAP SMEAR, EXTERNAL: NEGATIVE

## 2025-05-02 ENCOUNTER — OFFICE VISIT (OUTPATIENT)
Age: 45
End: 2025-05-02
Payer: COMMERCIAL

## 2025-05-02 VITALS
BODY MASS INDEX: 32.57 KG/M2 | HEIGHT: 70 IN | HEART RATE: 72 BPM | DIASTOLIC BLOOD PRESSURE: 83 MMHG | SYSTOLIC BLOOD PRESSURE: 133 MMHG | TEMPERATURE: 98.5 F | WEIGHT: 227.5 LBS | RESPIRATION RATE: 14 BRPM

## 2025-05-02 DIAGNOSIS — Z71.3 WEIGHT LOSS COUNSELING, ENCOUNTER FOR: ICD-10-CM

## 2025-05-02 DIAGNOSIS — Z78.9 NON-SMOKER: ICD-10-CM

## 2025-05-02 DIAGNOSIS — E66.09 CLASS 1 OBESITY DUE TO EXCESS CALORIES WITH SERIOUS COMORBIDITY AND BODY MASS INDEX (BMI) OF 32.0 TO 32.9 IN ADULT: Primary | ICD-10-CM

## 2025-05-02 DIAGNOSIS — E66.811 CLASS 1 OBESITY DUE TO EXCESS CALORIES WITH SERIOUS COMORBIDITY AND BODY MASS INDEX (BMI) OF 32.0 TO 32.9 IN ADULT: Primary | ICD-10-CM

## 2025-05-02 PROCEDURE — 99213 OFFICE O/P EST LOW 20 MIN: CPT | Performed by: FAMILY MEDICINE

## 2025-05-02 RX ORDER — PHENTERMINE HYDROCHLORIDE 37.5 MG/1
37.5 TABLET ORAL
Qty: 30 TABLET | Refills: 0 | Status: SHIPPED | OUTPATIENT
Start: 2025-05-02 | End: 2025-06-01

## 2025-05-02 SDOH — ECONOMIC STABILITY: FOOD INSECURITY: WITHIN THE PAST 12 MONTHS, THE FOOD YOU BOUGHT JUST DIDN'T LAST AND YOU DIDN'T HAVE MONEY TO GET MORE.: NEVER TRUE

## 2025-05-02 SDOH — ECONOMIC STABILITY: FOOD INSECURITY: WITHIN THE PAST 12 MONTHS, YOU WORRIED THAT YOUR FOOD WOULD RUN OUT BEFORE YOU GOT MONEY TO BUY MORE.: NEVER TRUE

## 2025-05-02 ASSESSMENT — PATIENT HEALTH QUESTIONNAIRE - PHQ9
SUM OF ALL RESPONSES TO PHQ QUESTIONS 1-9: 1
SUM OF ALL RESPONSES TO PHQ QUESTIONS 1-9: 1
7. TROUBLE CONCENTRATING ON THINGS, SUCH AS READING THE NEWSPAPER OR WATCHING TELEVISION: SEVERAL DAYS
3. TROUBLE FALLING OR STAYING ASLEEP: NOT AT ALL
6. FEELING BAD ABOUT YOURSELF - OR THAT YOU ARE A FAILURE OR HAVE LET YOURSELF OR YOUR FAMILY DOWN: NOT AT ALL
5. POOR APPETITE OR OVEREATING: NOT AT ALL
1. LITTLE INTEREST OR PLEASURE IN DOING THINGS: NOT AT ALL
10. IF YOU CHECKED OFF ANY PROBLEMS, HOW DIFFICULT HAVE THESE PROBLEMS MADE IT FOR YOU TO DO YOUR WORK, TAKE CARE OF THINGS AT HOME, OR GET ALONG WITH OTHER PEOPLE: NOT DIFFICULT AT ALL
SUM OF ALL RESPONSES TO PHQ QUESTIONS 1-9: 1
4. FEELING TIRED OR HAVING LITTLE ENERGY: NOT AT ALL
2. FEELING DOWN, DEPRESSED OR HOPELESS: NOT AT ALL
9. THOUGHTS THAT YOU WOULD BE BETTER OFF DEAD, OR OF HURTING YOURSELF: NOT AT ALL
SUM OF ALL RESPONSES TO PHQ QUESTIONS 1-9: 1
8. MOVING OR SPEAKING SO SLOWLY THAT OTHER PEOPLE COULD HAVE NOTICED. OR THE OPPOSITE, BEING SO FIGETY OR RESTLESS THAT YOU HAVE BEEN MOVING AROUND A LOT MORE THAN USUAL: NOT AT ALL

## 2025-05-02 NOTE — PROGRESS NOTES
[facility]  [address] [place] [address]  Phone: [phone]  Fax: [phone]      Patient Name: Janien Ventura   Patient :  1980       ASSESSMENT/PLAN     Assessment & Plan  1. Weight management.  - A comprehensive discussion was held regarding the potential side effects of Adipex, including its appetite-suppressing properties, which may lead to decreased food intake. The importance of maintaining adequate nutrition was emphasized.  - Heart sounds were assessed and found to be normal. She was advised to monitor her heart rate using her Apple watch for the initial few days of treatment.  - The potential for muscle loss and subsequent metabolic slowdown was explained, along with the increased risk of osteoporosis or bone weakness, particularly during perimenopause. She was encouraged to prioritize protein and vegetable intake to ensure adequate micronutrient consumption.  - Adipex was prescribed with instructions to take it in the morning. She was advised to maintain hydration, avoid alcohol consumption, and consider a multivitamin if dietary intake is insufficient. A follow-up appointment was scheduled for 30 days post-initiation of Adipex.    1. Class 1 obesity due to excess calories with serious comorbidity and body mass index (BMI) of 32.0 to 32.9 in adult  -     phentermine (ADIPEX-P) 37.5 MG tablet; Take 1 tablet by mouth every morning (before breakfast) for 30 days. Max Daily Amount: 37.5 mg, Disp-30 tablet, R-0Normal  2. Weight loss counseling, encounter for  3. Non-smoker     Patient Instructions   Take Adipex in the morning.  Eat something 1 to 2 hours after taking Adipex.  Monitor heart rate using Apple watch for the first few days.  Prioritize protein and vegetable intake.  Stay well hydrated with water.  Avoid alcohol consumption.  Consider taking a multivitamin if dietary intake is

## 2025-05-02 NOTE — PATIENT INSTRUCTIONS
Take Adipex in the morning.  Eat something 1 to 2 hours after taking Adipex.  Monitor heart rate using Apple watch for the first few days.  Prioritize protein and vegetable intake.  Stay well hydrated with water.  Avoid alcohol consumption.  Consider taking a multivitamin if dietary intake is insufficient.  Schedule a follow-up appointment in 30 days, which can be virtual.

## 2025-06-03 ENCOUNTER — OFFICE VISIT (OUTPATIENT)
Age: 45
End: 2025-06-03
Payer: COMMERCIAL

## 2025-06-03 VITALS
BODY MASS INDEX: 33.16 KG/M2 | HEART RATE: 78 BPM | TEMPERATURE: 98.4 F | RESPIRATION RATE: 14 BRPM | SYSTOLIC BLOOD PRESSURE: 130 MMHG | WEIGHT: 231.6 LBS | HEIGHT: 70 IN | DIASTOLIC BLOOD PRESSURE: 82 MMHG

## 2025-06-03 DIAGNOSIS — G44.209 TENSION HEADACHE: ICD-10-CM

## 2025-06-03 DIAGNOSIS — R03.0 ELEVATED BLOOD PRESSURE READING: Primary | ICD-10-CM

## 2025-06-03 DIAGNOSIS — R07.89 OTHER CHEST PAIN: ICD-10-CM

## 2025-06-03 PROCEDURE — 93000 ELECTROCARDIOGRAM COMPLETE: CPT | Performed by: FAMILY MEDICINE

## 2025-06-03 PROCEDURE — 99213 OFFICE O/P EST LOW 20 MIN: CPT | Performed by: FAMILY MEDICINE

## 2025-06-03 RX ORDER — BLOOD PRESSURE TEST KIT
1 KIT MISCELLANEOUS DAILY
Qty: 1 KIT | Refills: 0 | Status: SHIPPED | OUTPATIENT
Start: 2025-06-03

## 2025-06-03 ASSESSMENT — PATIENT HEALTH QUESTIONNAIRE - PHQ9
2. FEELING DOWN, DEPRESSED OR HOPELESS: NOT AT ALL
SUM OF ALL RESPONSES TO PHQ QUESTIONS 1-9: 0
1. LITTLE INTEREST OR PLEASURE IN DOING THINGS: NOT AT ALL

## 2025-06-03 ASSESSMENT — ENCOUNTER SYMPTOMS
DIARRHEA: 0
VOMITING: 0
CHEST TIGHTNESS: 0
ABDOMINAL PAIN: 0
NAUSEA: 0
SHORTNESS OF BREATH: 0

## 2025-06-03 NOTE — PATIENT INSTRUCTIONS
Thank you for following up with us at [facility] outpatient residency clinic! It was a pleasure to meet you today!     Our plan is the following:  - please  your blood pressure cuff at pharmacy  - as discussed log your blood pressure at home in the mornings I have attached instructions on how to properly measure that at home  - get your lab done to look at your kidney function to make sure it is doing well and also try to drink 64 oz of water daily  - for headache take zyrtec everyday for seasonal allergies and consider nasal saline spray     If you have any additional questions or concerns, please call the office ([phone]) and speak to one of the staff. They will triage and forward the message to the doctors! Have a great rest of your day!

## 2025-06-03 NOTE — PROGRESS NOTES
General: She is not in acute distress.     Appearance: Normal appearance. She is obese. She is not ill-appearing.   HENT:      Head: Normocephalic.   Eyes:      General: No scleral icterus.  Cardiovascular:      Rate and Rhythm: Normal rate and regular rhythm.      Pulses: Normal pulses.      Heart sounds: Normal heart sounds. No murmur heard.  Pulmonary:      Effort: Pulmonary effort is normal. No respiratory distress.      Breath sounds: Normal breath sounds. No wheezing, rhonchi or rales.   Musculoskeletal:         General: No swelling or tenderness.      Right lower leg: No edema.      Left lower leg: No edema.   Skin:     General: Skin is warm.      Capillary Refill: Capillary refill takes less than 2 seconds.   Neurological:      Mental Status: She is alert.   Psychiatric:         Mood and Affect: Mood normal.         LABS     Abstract on 06/03/2025   Component Date Value Ref Range Status    PAP Smear, External 04/24/2025 Negative   Final        No results found for this visit on 06/03/25.     COMMUNICATION   Questions/concerns answered. Patient verbalized and expressed understanding. Medications, laboratory testing, imaging, consultation, and follow up as documented in this encounter.     Patient Instructions   Thank you for following up with us at [facility] outpatient residency clinic! It was a pleasure to meet you today!     Our plan is the following:  - please  your blood pressure cuff at pharmacy  - as discussed log your blood pressure at home in the mornings I have attached instructions on how to properly measure that at home  - get your lab done to look at your kidney function to make sure it is doing well and also try to drink 64 oz of water daily  - for headache take zyrtec everyday for seasonal allergies and consider nasal saline spray     If you have any additional questions or concerns, please call the office ([phone]) and speak to one of the staff. They will